# Patient Record
Sex: FEMALE | Race: WHITE | NOT HISPANIC OR LATINO | Employment: STUDENT | URBAN - NONMETROPOLITAN AREA
[De-identification: names, ages, dates, MRNs, and addresses within clinical notes are randomized per-mention and may not be internally consistent; named-entity substitution may affect disease eponyms.]

---
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
## 2021-02-05 ENCOUNTER — TELEPHONE (OUTPATIENT)
Dept: PEDIATRICS | Facility: CLINIC | Age: 21
End: 2021-02-05

## 2021-02-05 NOTE — TELEPHONE ENCOUNTER
PT'S MOM CALLED AND SAID THAT THIS PATIENT IS NEEDING A SHOT RECORD FOR SCHOOL. I HAVE SIBLING'S SHOT RECORD AS WELL. THEY BOTH NEED TO BE EMAILED TO HER AT [email or web address]

## 2022-03-23 ENCOUNTER — OFFICE VISIT (OUTPATIENT)
Dept: ORTHOPEDIC SURGERY | Facility: CLINIC | Age: 22
End: 2022-03-23

## 2022-03-23 VITALS — HEIGHT: 62 IN | WEIGHT: 140.5 LBS | BODY MASS INDEX: 25.86 KG/M2

## 2022-03-23 DIAGNOSIS — M75.82 TENDINITIS OF LEFT ROTATOR CUFF: ICD-10-CM

## 2022-03-23 DIAGNOSIS — M25.511 ACUTE PAIN OF RIGHT SHOULDER: Primary | ICD-10-CM

## 2022-03-23 DIAGNOSIS — M25.511 CHRONIC RIGHT SHOULDER PAIN: Primary | ICD-10-CM

## 2022-03-23 DIAGNOSIS — G89.29 CHRONIC RIGHT SHOULDER PAIN: Primary | ICD-10-CM

## 2022-03-23 PROCEDURE — 99204 OFFICE O/P NEW MOD 45 MIN: CPT | Performed by: NURSE PRACTITIONER

## 2022-03-23 RX ORDER — MELOXICAM 15 MG/1
15 TABLET ORAL DAILY
Qty: 14 TABLET | Refills: 0 | Status: SHIPPED | OUTPATIENT
Start: 2022-03-23 | End: 2022-03-30 | Stop reason: SDUPTHER

## 2022-03-23 RX ORDER — METHYLPREDNISOLONE 4 MG/1
TABLET ORAL
Qty: 21 TABLET | Refills: 0 | Status: SHIPPED | OUTPATIENT
Start: 2022-03-23

## 2022-03-23 NOTE — PROGRESS NOTES
Nia Lai is a 22 y.o. female   Primary provider:  Jazzy Howell MD       Chief Complaint   Patient presents with   • Right Shoulder - Initial Evaluation, Pain       HISTORY OF PRESENT ILLNESS:      Patient is a 22-year-old female who presents with complaints of right shoulder pain that has been present for approximately the past 3 months.  She denies injury or known precipitating factor.  She does report regular exercise, lifting weights does seem to aggravate shoulder pain.  She indicates her upper lateral shoulder, deltoid area, as area of main concern.  She reports her pain is mild to moderate and intermittent.  Pain is sometimes stabbing and other times aching.  She has occasional clicking.  She has tried OTC medications and rice therapy without relief of symptoms.  She has no complaints of burning, tingling, numbness.  She reports some discomfort with overhead activities, especially weightlifting.  She also has occasional discomfort when reaching behind her.  She is sent for x-rays today.    Shoulder Injury   The right shoulder is affected. There was no injury mechanism. The quality of the pain is described as aching and stabbing. The pain is moderate. Associated symptoms comments: Clicking/popping/snapping. She has tried rest, ice, heat, NSAIDs and acetaminophen for the symptoms.        CONCURRENT MEDICAL HISTORY:    Past Medical History:   Diagnosis Date   • Abdominal pain    • Leukorrhea    • Myopia    • Premenstrual dysphoric disorder    • Streptococcal sore throat        No Known Allergies      Current Outpatient Medications:   •  Erenumab-aooe (Aimovig) 70 MG/ML prefilled syringe, Inject 1 mL under the skin into the appropriate area as directed Every 30 (Thirty) Days., Disp: 3 mL, Rfl: 12  •  atovaquone-proguanil (MALARONE) 250-100 MG tablet per tablet, Take 1 tablet by mouth Daily as directed., Disp: 34 tablet, Rfl: 0  •  magnesium oxide (MAGOX) 400 (241.3 Mg) MG tablet tablet, Take 1  "tablet by mouth Daily As Needed., Disp: 90 tablet, Rfl: 1  •  meloxicam (Mobic) 15 MG tablet, Take 1 tablet by mouth Daily for 14 days., Disp: 14 tablet, Rfl: 0  •  methylPREDNISolone (MEDROL) 4 MG dose pack, Use as directed by package instructions, Disp: 21 tablet, Rfl: 0  •  norethindrone-ethinyl estradiol (LOESTRIN 1/20, 21,) 1-20 MG-MCG per tablet, Take 1 tablet by mouth Once a day., Disp: 90 tablet, Rfl: 3  •  propranolol (INDERAL) 10 MG tablet, Take 1 tablet by mouth Daily., Disp: 90 tablet, Rfl: 0  •  Thyroid 60 MG PO tablet, Take 1 tablet by mouth Daily., Disp: 90 tablet, Rfl: 3  •  topiramate (TOPAMAX) 50 MG tablet, Take 1 tablet by mouth Daily., Disp: 90 tablet, Rfl: 3    Past Surgical History:   Procedure Laterality Date   • LABIAL ADHESION LYSIS         Family History   Problem Relation Age of Onset   • Strabismus Father    • Amblyopia Father         Social History     Socioeconomic History   • Marital status: Single   Tobacco Use   • Smoking status: Never Smoker   Substance and Sexual Activity   • Alcohol use: No        Review of Systems   Musculoskeletal:        Right shoulder pain   Neurological: Positive for headaches.   All other systems reviewed and are negative.      PHYSICAL EXAMINATION:       Ht 157.5 cm (62\")   Wt 63.7 kg (140 lb 8 oz)   BMI 25.70 kg/m²     Physical Exam  Vitals and nursing note reviewed.   Constitutional:       General: She is not in acute distress.     Appearance: She is well-developed. She is not toxic-appearing.   HENT:      Head: Normocephalic.   Pulmonary:      Effort: Pulmonary effort is normal. No respiratory distress.   Skin:     General: Skin is warm and dry.   Neurological:      Mental Status: She is alert and oriented to person, place, and time.   Psychiatric:         Behavior: Behavior normal.         Thought Content: Thought content normal.         Judgment: Judgment normal.         GAIT:     [x]  Normal  []  Antalgic    Assistive device: [x]  None  []  " Walker     []  Crutches  []  Cane     []  Wheelchair  []  Stretcher    Right Shoulder Exam     Tenderness   The patient is experiencing tenderness in the acromion.    Range of Motion   The patient has normal right shoulder ROM.    Tests   Cross arm: negative  Impingement: positive (mild)    Other   Erythema: absent  Sensation: normal  Pulse: present    Comments:  Patient has mild increased pain with full can test and empty can test.  Liftoff test is negative.                      ASSESSMENT:    Diagnoses and all orders for this visit:    Chronic right shoulder pain    Tendinitis of left rotator cuff    Other orders  -     methylPREDNISolone (MEDROL) 4 MG dose pack; Use as directed by package instructions  -     meloxicam (Mobic) 15 MG tablet; Take 1 tablet by mouth Daily for 14 days.          PLAN    X-rays reviewed, no acute bony abnormality identified.  Patient does have some increased discomfort with special testing of supraspinatus.  Patient has normal range of motion today.  Patient only reports mild discomfort with range of motion.  Suspect possible tendinitis secondary to exercise.  After discussing available treatment options, patient prescribed steroid and prescription strength NSAID.  Patient also instructed to take time off of weightlifting and return gradually.  Patient given HEP for range of motion exercises.  Patient is currently home on spring break but will return to [place] next week.  Patient instructed to call me if symptoms do not improve with our current plan of care.  Plan to send patient to formal physical therapy or possibly order MRI if symptoms do not improve with conservative management.    Return if symptoms worsen or fail to improve.    EMR Dragon/Transciption Disclaimer: Some of this note may be an electronic transcription/translation of spoken language to printed text.  The electronic translation of spoken language may permit erroneous, or at times, nonsensical words or phrases to be  inadvertently transcribed. Although I have reviewed the note for such errors, some may still exist.       This document has been electronically signed by SILVIA Desir on March 23, 2022 14:22 CDT

## 2022-03-25 ENCOUNTER — OFFICE VISIT (OUTPATIENT)
Dept: ORTHOPEDIC SURGERY | Facility: CLINIC | Age: 22
End: 2022-03-25

## 2022-03-25 VITALS — WEIGHT: 144 LBS | HEIGHT: 62 IN | BODY MASS INDEX: 26.5 KG/M2

## 2022-03-25 DIAGNOSIS — G89.29 CHRONIC RIGHT SHOULDER PAIN: Primary | ICD-10-CM

## 2022-03-25 DIAGNOSIS — M25.511 CHRONIC RIGHT SHOULDER PAIN: Primary | ICD-10-CM

## 2022-03-25 PROCEDURE — 20610 DRAIN/INJ JOINT/BURSA W/O US: CPT | Performed by: NURSE PRACTITIONER

## 2022-03-25 RX ORDER — TRIAMCINOLONE ACETONIDE 40 MG/ML
40 INJECTION, SUSPENSION INTRA-ARTICULAR; INTRAMUSCULAR
Status: COMPLETED | OUTPATIENT
Start: 2022-03-25 | End: 2022-03-25

## 2022-03-25 RX ORDER — LIDOCAINE HYDROCHLORIDE 10 MG/ML
2 INJECTION, SOLUTION INFILTRATION; PERINEURAL
Status: COMPLETED | OUTPATIENT
Start: 2022-03-25 | End: 2022-03-25

## 2022-03-25 RX ADMIN — TRIAMCINOLONE ACETONIDE 40 MG: 40 INJECTION, SUSPENSION INTRA-ARTICULAR; INTRAMUSCULAR at 10:14

## 2022-03-25 RX ADMIN — LIDOCAINE HYDROCHLORIDE 2 ML: 10 INJECTION, SOLUTION INFILTRATION; PERINEURAL at 10:14

## 2022-03-25 NOTE — PROGRESS NOTES
"Nia Lai is a 22 y.o. female      Chief Complaint   Patient presents with   • Right Shoulder - Follow-up, Pain       HISTORY OF PRESENT ILLNESS:       Patient is a 22-year-old female who presents requesting subacromial injection.  Patient was seen in this office 2 days ago for right shoulder pain.  She was prescribed prescription strength NSAID and oral steroid.  She states after considering her options at home she desires to proceed with subacromial injection.  She has had no change in symptoms or new complaints.      CONCURRENT MEDICAL HISTORY:    The following portions of the patient's history were reviewed and updated as appropriate: allergies, current medications, past family history, past medical history, past social history, past surgical history and problem list.     ROS  No fevers or chills.  No chest pain or shortness of air.  No GI or  disturbances.   Right Shoulder pain.    PHYSICAL EXAMINATION:       Ht 157.5 cm (62\")   Wt 65.3 kg (144 lb)   BMI 26.34 kg/m²     Physical Exam  Vitals and nursing note reviewed.   Constitutional:       General: She is not in acute distress.     Appearance: She is well-developed. She is not toxic-appearing.   HENT:      Head: Normocephalic.   Pulmonary:      Effort: Pulmonary effort is normal. No respiratory distress.   Skin:     General: Skin is warm and dry.   Neurological:      Mental Status: She is alert and oriented to person, place, and time.   Psychiatric:         Behavior: Behavior normal.         Thought Content: Thought content normal.         Judgment: Judgment normal.         GAIT:     [x]  Normal  []  Antalgic    Assistive device: [x]  None  []  Walker     []  Crutches  []  Cane     []  Wheelchair  []  Stretcher    Right Shoulder Exam     Tenderness   The patient is experiencing tenderness in the acromion.    Range of Motion   The patient has normal right shoulder ROM.    Other   Erythema: absent  Sensation: normal  Pulse: present              XR " Shoulder 2+ View Right    Result Date: 3/24/2022  Narrative: PROCEDURE: RADIOGRAPHS OF THE RIGHT SHOULDER COMPLETE 2 OR MORE VIEWS CLINICAL INDICATION: The patient is 22 years old and is Female; pain, M25.511 Pain in right shoulder TECHNIQUE: Three views of the RIGHT shoulder were obtained in [place], internal rotation and transcapular-Y projections. COMPARISON: No relevant prior studies available. FINDINGS: MINERALIZATION: Normal. BONES: There is no fracture or dislocation.  No worrisome bone lesions. No significant osteophytes. GLENOHUMERAL JOINT: No significant findings. The joint spaces are preserved. ACROMIOCLAVICULAR JOINT: The AC joint is unremarkable. SOFT TISSUES: No calcifications. No soft tissue abnormalities are seen. VISUALIZED RIBS, SPINE, AND LUNG: No significant finding.     Impression: No acute radiographic abnormality. Electronically signed by:  Mathieu Martin MD  3/24/2022 12:25 AM CDT Workstation: [id number]            ASSESSMENT:    Diagnoses and all orders for this visit:    Chronic right shoulder pain    Other orders  -     Large Joint Arthrocentesis: R subacromial bursa      Large Joint Arthrocentesis: R subacromial bursa  Date/Time: 3/25/2022 10:14 AM  Consent given by: patient  Site marked: site marked  Timeout: Immediately prior to procedure a time out was called to verify the correct patient, procedure, equipment, support staff and site/side marked as required   Supporting Documentation  Indications: pain   Procedure Details  Location: shoulder - R subacromial bursa  Preparation: Patient was prepped and draped in the usual sterile fashion  Needle size: 22 G  Approach: posterior  Medications administered: 40 mg triamcinolone acetonide 40 MG/ML; 2 mL lidocaine 1 %  Patient tolerance: patient tolerated the procedure well with no immediate complications          PLAN    Risk, benefits, alternatives to subacromial injection explained.  Patient verbalized understanding and tolerated injection  well.  Patient instructed to continue current plan of care set out on 8/23/2022.  Patient instructed that if she does not start feeling better in 2 to 4 weeks to call me and I can order her formal physical therapy.    Return if symptoms worsen or fail to improve.    EMR Dragon/Transciption Disclaimer: Some of this note may be an electronic transcription/translation of spoken language to printed text.  The electronic translation of spoken language may permit erroneous, or at times, nonsensical words or phrases to be inadvertently transcribed. Although I have reviewed the note for such errors, some may still exist.       This document has been electronically signed by SILVIA Desir on March 25, 2022 13:00 CDT

## 2022-03-30 RX ORDER — MELOXICAM 15 MG/1
15 TABLET ORAL DAILY
Qty: 14 TABLET | Refills: 0 | Status: SHIPPED | OUTPATIENT
Start: 2022-03-30 | End: 2022-04-13